# Patient Record
Sex: FEMALE | Race: WHITE | URBAN - METROPOLITAN AREA
[De-identification: names, ages, dates, MRNs, and addresses within clinical notes are randomized per-mention and may not be internally consistent; named-entity substitution may affect disease eponyms.]

---

## 2018-09-24 ENCOUNTER — OFFICE VISIT (OUTPATIENT)
Dept: FAMILY MEDICINE CLINIC | Age: 28
End: 2018-09-24
Payer: COMMERCIAL

## 2018-09-24 VITALS
WEIGHT: 134.8 LBS | SYSTOLIC BLOOD PRESSURE: 128 MMHG | BODY MASS INDEX: 20.43 KG/M2 | DIASTOLIC BLOOD PRESSURE: 84 MMHG | HEART RATE: 74 BPM | OXYGEN SATURATION: 98 % | HEIGHT: 68 IN

## 2018-09-24 DIAGNOSIS — R19.7 DIARRHEA OF PRESUMED INFECTIOUS ORIGIN: ICD-10-CM

## 2018-09-24 DIAGNOSIS — Z00.00 ANNUAL PHYSICAL EXAM: Primary | ICD-10-CM

## 2018-09-24 PROCEDURE — 99385 PREV VISIT NEW AGE 18-39: CPT | Performed by: FAMILY MEDICINE

## 2018-09-24 RX ORDER — AZITHROMYCIN 1 G
1 PACKET (EA) ORAL ONCE
Qty: 1 PACKAGE | Refills: 0 | Status: SHIPPED | OUTPATIENT
Start: 2018-09-24 | End: 2018-09-24

## 2018-09-24 ASSESSMENT — PATIENT HEALTH QUESTIONNAIRE - PHQ9
2. FEELING DOWN, DEPRESSED OR HOPELESS: 0
SUM OF ALL RESPONSES TO PHQ9 QUESTIONS 1 & 2: 0
SUM OF ALL RESPONSES TO PHQ QUESTIONS 1-9: 0
1. LITTLE INTEREST OR PLEASURE IN DOING THINGS: 0
SUM OF ALL RESPONSES TO PHQ QUESTIONS 1-9: 0

## 2018-09-24 NOTE — PROGRESS NOTES
[address] Note    Date: 9/24/2018                                               Subjective/Objective:     Chief Complaint   Patient presents with    New Patient       HPI   Pt here to establish care. Last tdap unknown, less than 10 years she thinks. Last pap smear 1/2017, WNL. Sees a GYN. Concerns for loose stools and diarrhea over the last 2 weeks. Feels rumbling in her intestines but no pain. Pt recently traveled to [address] [place], [place] [address] for vacation and symptoms started after that. No n/v. +acid reflux. No fever. No rash. No joint pain. No relation to food. No different foods. No blood in stool. There are no active problems to display for this patient. Past Medical History:   Diagnosis Date    Breast CA ([address]     BRCA-1 positive, gets mammogram q6 months       Current Outpatient Prescriptions   Medication Sig Dispense Refill    azithromycin (ZITHROMAX) 1 g powder Take 1 Package by mouth once for 1 dose 1 Package 0     No current facility-administered medications for this visit. No Known Allergies    Review of Systems   No CP, no SOB, no rash, no bruise, no HA, no vision change, no ankle swelling, no hearing problems, no LAD        Vitals:  /84 (Site: Left Upper Arm, Position: Sitting, Cuff Size: Small Adult)   Pulse 74   Ht 5' 8\" (1.727 m)   Wt 134 lb 12.8 oz (61.1 kg)   SpO2 98%   BMI 20.50 kg/m²     Physical Exam   General:  Well-appearing, NAD, alert, non-toxic  HEENT:  Normocephalic, atraumatic. Pupils equal and round. TMs pearly with good landmarks. Moist mucous membranes. Normal dentition  NECK:  Supple, normal range of motion, no LAD, no meningeal signs, no JVD, nontender  CHEST/LUNGS: CTAB, no crackles, no wheeze, no rhonchi. Symmetric rise  CARDIOVASCULAR: RRR,  no murmur, no rub  ABDOMEN: Soft, non-tender, non-distended. No masses  EXTREMETIES: Normal movement of all extremities. No edema. No joint swelling.   SKIN:  No rash, no cellulitis, no bruising, no petechiae/purpura/vesicles/pustules/abscess  PSYCH:  A+O x 3; normal affect  NEURO:  GCS 15, CN2-12 grossly intact, no focal motor/sensory deficits, no cerebellar deficits, normal gait, normal speech        Assessment/Plan     18-year-old female here for annual exam.  She is up-to-date with Pap smear and mammogram.  She declines flu shot. She is likely a pale her tetanus shot, advised bringing official records. We will check routine labs. Patient has diarrhea for 2-3 weeks. Given her travel to mouth, is likely infectious. We'll treat for presumed traveler's diarrhea with azithromycin. Patient advised to call if her symptoms do not resolve. May need referral to GI. Discussed with patient medication/s dosage, instructions, potential S/E, A/R and Drug Interaction  Educational material provided regarding patient's condition  Tylenol or Motrin as needed for pain or fever  Advise to return here if worse or go to nearest ER  Encourage fluids  Pt discharged in stable condition at 16:23      1. Annual physical exam    - CBC Auto Differential; Future  - Comprehensive Metabolic Panel; Future  - Hemoglobin A1C; Future  - Lipid, Fasting; Future  - TSH with Reflex; Future    2. Diarrhea of presumed infectious origin    - azithromycin (ZITHROMAX) 1 g powder; Take 1 Package by mouth once for 1 dose  Dispense: 1 Package;  Refill: 0      Orders Placed This Encounter   Procedures    CBC Auto Differential     Standing Status:   Future     Standing Expiration Date:   9/24/2019    Comprehensive Metabolic Panel     Standing Status:   Future     Standing Expiration Date:   9/24/2019    Hemoglobin A1C     Standing Status:   Future     Standing Expiration Date:   9/24/2019    Lipid, Fasting     Standing Status:   Future     Standing Expiration Date:   9/24/2019    TSH with Reflex     Standing Status:   Future     Standing Expiration Date:   9/24/2019       Return in about 1 year (around 9/24/2019) for annual physical.    Clotilde Wiseman MD    9/24/2018  4:22 PM

## 2018-10-02 ENCOUNTER — TELEPHONE (OUTPATIENT)
Dept: FAMILY MEDICINE CLINIC | Age: 28
End: 2018-10-02

## 2018-10-02 DIAGNOSIS — R19.7 DIARRHEA, UNSPECIFIED TYPE: Primary | ICD-10-CM
